# Patient Record
Sex: FEMALE | Race: BLACK OR AFRICAN AMERICAN | NOT HISPANIC OR LATINO
[De-identification: names, ages, dates, MRNs, and addresses within clinical notes are randomized per-mention and may not be internally consistent; named-entity substitution may affect disease eponyms.]

---

## 2022-01-25 ENCOUNTER — APPOINTMENT (OUTPATIENT)
Dept: BARIATRICS | Facility: CLINIC | Age: 22
End: 2022-01-25

## 2023-04-24 ENCOUNTER — NON-APPOINTMENT (OUTPATIENT)
Age: 23
End: 2023-04-24

## 2023-05-11 ENCOUNTER — APPOINTMENT (OUTPATIENT)
Dept: BARIATRICS | Facility: CLINIC | Age: 23
End: 2023-05-11

## 2023-06-19 ENCOUNTER — APPOINTMENT (OUTPATIENT)
Dept: BARIATRICS | Facility: CLINIC | Age: 23
End: 2023-06-19
Payer: MEDICAID

## 2023-06-19 VITALS
SYSTOLIC BLOOD PRESSURE: 161 MMHG | HEART RATE: 94 BPM | BODY MASS INDEX: 50.02 KG/M2 | WEIGHT: 293 LBS | HEIGHT: 64 IN | TEMPERATURE: 97.7 F | DIASTOLIC BLOOD PRESSURE: 95 MMHG | OXYGEN SATURATION: 97 %

## 2023-06-19 DIAGNOSIS — D57.3 SICKLE-CELL TRAIT: ICD-10-CM

## 2023-06-19 DIAGNOSIS — Z00.00 ENCOUNTER FOR GENERAL ADULT MEDICAL EXAMINATION W/OUT ABNORMAL FINDINGS: ICD-10-CM

## 2023-06-19 DIAGNOSIS — E66.01 MORBID (SEVERE) OBESITY DUE TO EXCESS CALORIES: ICD-10-CM

## 2023-06-19 DIAGNOSIS — I10 ESSENTIAL (PRIMARY) HYPERTENSION: ICD-10-CM

## 2023-06-19 PROCEDURE — 99204 OFFICE O/P NEW MOD 45 MIN: CPT

## 2023-06-19 NOTE — PLAN
[FreeTextEntry1] : preop evaluation by heme, cardio, and pulm\par preop egd\par possible staged VSG to MDS given BMI 55

## 2023-06-19 NOTE — ADDENDUM
[FreeTextEntry1] : It was my pleasure to interact with Ms. ALYSA ORTEGA today. In summary, Ms. ALYSA ORTEGA has morbid obesity refractory to medical and dietary efforts for which She could benefit from weight loss surgery.  We discussed in detail the Erwin-en-Y gastric bypass, sleeve gastrectomy, and modified duodenal switch (DARYL/SIPS). She understood that these procedures are done primarily minimally invasively (laparoscopically or robotically), but that there is a possibility of conversion to laparotomy. In this particular case, with their body mass index we discussed realistic expectations with respect to weight loss.  Approximately 50% of excess weight will be lost if She has gastric bypass or sleeve gastrectomy, or, approximately 60% if She  has DARYL/SIPS.  In order to maintain weight loss or lose more weight, She will be required to modify diet and exercise habits (the "tool" concept of weight loss surgery).  We discussed the necessity for continuous, life-long medical care following surgery and the necessity for taking mineral and vitamin supplements daily for the rest of life. We discussed the importance of high protein diet and daily exercise for maximal success.\par \par We discussed complications that may follow bariatric surgery that include, but are not limited to, respiratory problems, pneumonia, thrombophlebitis, and pulmonary embolus.  We also discussed intra-abdominal complications including anastomotic leak, intra-abdominal infections, portal vein thrombosis and death that may result from bariatric surgery.  We discussed that there may be other complications related to a specific type of surgery, such as that gastric bypass patients may have severe dumping secondary to dietary indiscretion. With respect to sleeve gastrectomy we discussed the chances of having refractory GERD that may require intervention in the future including conversion to gastric bypass. We also discussed postoperative DVT prophylaxis to decrease the incidence of deep vein thrombosis when indicated. \par \par I specifically addressed the patient regarding pregnancy.  Weight loss surgery patients should not become pregnant in the first two years following surgery because of the high risk of fetal malformation and miscarriage.\par \par The prolonged discussion (greater than 45 minutes) centered primarily on the indications for surgery and evaluation of the risk/benefit ratio for Ms. ALYSA ORTEGA and other weight loss alternatives. I answered all questions about bariatric surgery and possible complications to the best of my ability.\par \par Once the preoperative evaluation is complete, I will review the chart and schedule the patient for a follow-up visit in order to answer any questions prior to scheduling surgery.\par \par Plan:\par Nutrition, Psych evaluations\par Cardiac, pulmonary evaluations\par EGD \par Interested in sleeve\par \par I, Dr. Nessa Green, spent 50 minutes with the patient >50% counseling/coordination of care including, reviewing the patient's history, performing an examination, reviewing relevant labs and radiographic imaging, reviewing PCP and consultant notes, discussion of medical and surgical management of the diagnosis as well as associated risks and benefits, and completing documentation.\par \par

## 2023-06-19 NOTE — HISTORY OF PRESENT ILLNESS
[de-identified] : Pt is a 24 y/o F with pmhx of morbid obesity BMI 55, HTN, sickle cell trait, who presents today feeling well here for evaluation for bariatric sx. Pt states she has struggled with her wt since childhood and states she has tried and failed several attempts at wt loss via diet and exercise. Reports that her current wt of 321 lbs is the most she has ever weighed. Pt denies any GERD symptoms. Reports past sxhx of 1 c section. Denies hx of smoking, reports alcohol use socially. Pt states she is not working at this time. STOP BANG questionnaire positive, will refer to cardio and pulm preop for evaluation. Will refer to heme preop for evaluation and clearance given hx of sickle cell trait. Pt denies ever having an EGD, will obtain preop. Pt likely a good candidate for two staged approach VSG to future MDS. Will complete bariatric workup testings first and will then determine plan for sx. We discussed at length surgical and non-surgical options and that non surgical approaches are unlikely to lead to long term, sustained weight loss. We also discussed that surgery alone is unlikely to be successful but should rather be seen as a tool for weight loss to be integrated with physical activity and nutritional counseling. The patient verbalized understanding and agrees to proceed with the evaluation. All risks of surgery were explained to the patient including the risks of leaks, infections, blood clots and death.\par

## 2023-06-19 NOTE — ASSESSMENT
[FreeTextEntry1] : Pt is a 24 y/o F with pmhx of morbid obesity BMI 55, HTN, sickle cell trait, who presents today feeling well here for evaluation for bariatric sx. Pt states she has struggled with her wt since childhood and states she has tried and failed several attempts at wt loss via diet and exercise. Reports that her current wt of 321 lbs is the most she has ever weighed. Pt denies any GERD symptoms. Reports past sxhx of 1 c section. Denies hx of smoking, reports alcohol use socially. Pt states she is not working at this time. STOP BANG questionnaire positive, will refer to cardio and pulm preop for evaluation. Will refer to heme preop for evaluation and clearance given hx of sickle cell trait. Pt denies ever having an EGD, will obtain preop. Pt likely a good candidate for two staged approach VSG to future MDS. Will complete bariatric workup testings first and will then determine plan for sx.

## 2023-07-10 ENCOUNTER — APPOINTMENT (OUTPATIENT)
Dept: BARIATRICS | Facility: CLINIC | Age: 23
End: 2023-07-10

## 2023-07-11 ENCOUNTER — NON-APPOINTMENT (OUTPATIENT)
Age: 23
End: 2023-07-11

## 2023-07-11 ENCOUNTER — APPOINTMENT (OUTPATIENT)
Dept: BARIATRICS | Facility: CLINIC | Age: 23
End: 2023-07-11
Payer: MEDICAID

## 2023-07-11 VITALS — BODY MASS INDEX: 55.1 KG/M2 | WEIGHT: 293 LBS

## 2023-07-11 PROCEDURE — 97802 MEDICAL NUTRITION INDIV IN: CPT | Mod: NC,95

## 2023-07-17 ENCOUNTER — NON-APPOINTMENT (OUTPATIENT)
Age: 23
End: 2023-07-17

## 2023-07-17 ENCOUNTER — APPOINTMENT (OUTPATIENT)
Dept: PULMONOLOGY | Facility: CLINIC | Age: 23
End: 2023-07-17
Payer: MEDICAID

## 2023-07-17 VITALS — BODY MASS INDEX: 50.02 KG/M2 | WEIGHT: 293 LBS | HEIGHT: 64 IN

## 2023-07-17 VITALS
OXYGEN SATURATION: 95 % | HEIGHT: 64 IN | TEMPERATURE: 97.9 F | WEIGHT: 293 LBS | SYSTOLIC BLOOD PRESSURE: 146 MMHG | BODY MASS INDEX: 50.02 KG/M2 | HEART RATE: 100 BPM | DIASTOLIC BLOOD PRESSURE: 91 MMHG

## 2023-07-17 DIAGNOSIS — Z87.898 PERSONAL HISTORY OF OTHER SPECIFIED CONDITIONS: ICD-10-CM

## 2023-07-17 DIAGNOSIS — G47.00 INSOMNIA, UNSPECIFIED: ICD-10-CM

## 2023-07-17 DIAGNOSIS — G47.10 HYPERSOMNIA, UNSPECIFIED: ICD-10-CM

## 2023-07-17 DIAGNOSIS — R06.83 SNORING: ICD-10-CM

## 2023-07-17 PROCEDURE — 99203 OFFICE O/P NEW LOW 30 MIN: CPT

## 2023-07-17 NOTE — ASSESSMENT
[FreeTextEntry1] : 24 y/o morbidly obese F with new onset of insomnia and snoring who is here for initial evaluation of possible sleep apnea before her bariatric surgery.

## 2023-07-17 NOTE — ASSESSMENT
[FreeTextEntry1] : 22 y/o morbidly obese F with new onset of insomnia and snoring who is here for initial evaluation of possible sleep apnea before her bariatric surgery.

## 2023-07-17 NOTE — PHYSICAL EXAM
[General Appearance - In No Acute Distress] : no acute distress [III] : III [FreeTextEntry1] : large neck  [Apical Impulse] : the apical impulse was normal [Heart Sounds] : normal S1 and S2 [] : no respiratory distress [Exaggerated Use Of Accessory Muscles For Inspiration] : no accessory muscle use [Abnormal Walk] : normal gait [Involuntary Movements] : no involuntary movements were seen [No Focal Deficits] : no focal deficits [Oriented To Time, Place, And Person] : oriented to person, place, and time [Affect] : the affect was normal

## 2023-07-17 NOTE — HISTORY OF PRESENT ILLNESS
[FreeTextEntry1] : 07/17/2023 :  ALYSA ORTEGA is a 23 year old female with PMHx morbid obesity, HTN sickle cell trait who is here for initial evaluation of possible sleep apnea before her bariatric surgery. \par \par She had sleep study in [place] at the age f 10 but not sure about the result, never was treated with CPAP. She reports snoring but in unsure about apnea, has dry mouth. For the past 3 months she has been experiencing insomnia both initiation and maintaining sleep. She denies taking any sleeping medication. She reports hypersomnolence during the day. \par  \par \par Sleep Routine:\par \par SHe goes to bed at 10 , sleep latency hours, she wakes up twice, WASO varies then she is up at 6:30. She naps everyday x 3 hours . Her ESS is 12/24. \par \par \par She denies cataplexy, RLS, parasomnia, or any other sleep behavioral issues. \par

## 2023-08-11 ENCOUNTER — APPOINTMENT (OUTPATIENT)
Dept: BARIATRICS | Facility: CLINIC | Age: 23
End: 2023-08-11
Payer: MEDICAID

## 2023-08-11 VITALS — WEIGHT: 293 LBS | BODY MASS INDEX: 56.3 KG/M2

## 2023-08-11 PROCEDURE — 97803 MED NUTRITION INDIV SUBSEQ: CPT | Mod: NC,95

## 2023-08-16 ENCOUNTER — APPOINTMENT (OUTPATIENT)
Dept: HEART AND VASCULAR | Facility: CLINIC | Age: 23
End: 2023-08-16